# Patient Record
Sex: FEMALE | URBAN - METROPOLITAN AREA
[De-identification: names, ages, dates, MRNs, and addresses within clinical notes are randomized per-mention and may not be internally consistent; named-entity substitution may affect disease eponyms.]

---

## 2019-10-07 ENCOUNTER — APPOINTMENT (RX ONLY)
Dept: URBAN - METROPOLITAN AREA CLINIC 23 | Facility: CLINIC | Age: 29
Setting detail: DERMATOLOGY
End: 2019-10-07

## 2019-10-07 DIAGNOSIS — Z41.9 ENCOUNTER FOR PROCEDURE FOR PURPOSES OTHER THAN REMEDYING HEALTH STATE, UNSPECIFIED: ICD-10-CM

## 2019-10-07 PROCEDURE — ? DYSPORT

## 2019-10-07 PROCEDURE — ? HYALURONIDASE INJECTION

## 2019-10-07 ASSESSMENT — LOCATION SIMPLE DESCRIPTION DERM: LOCATION SIMPLE: LEFT LIP

## 2019-10-07 ASSESSMENT — LOCATION DETAILED DESCRIPTION DERM: LOCATION DETAILED: LEFT INFERIOR VERMILION LIP

## 2019-10-07 ASSESSMENT — LOCATION ZONE DERM: LOCATION ZONE: LIP

## 2019-10-07 NOTE — PROCEDURE: HYALURONIDASE INJECTION
Lot # (Optional): [id number]
Expiration Date (Optional): 10/20
Administered By (Optional): Dr. Henrry Patel
Consent: The risks of contour defects and dimpling of the skin were reviewed with the patient prior to the injection.
Detail Level: Detailed
Total Volume (Ccs): 1
Hyaluronidase Preparation: hyaluronidase 150 USP units/ml

## 2019-10-07 NOTE — PROCEDURE: DYSPORT
Additional Area 5 Location: upper lip cutaneous
Additional Area 2 Location: masseters
Additional Area 2 Units: 48
Anterior Platysmal Bands Units: 0
Additional Area 1 Location: crows feet
Consent: Written consent obtained. Risks include but not limited to lid/brow ptosis, bruising, swelling, diplopia, temporary effect, incomplete chemical denervation.
Post-Care Instructions: Patient instructed to not lie down for 4 hours, limit physical activity for 24 hours and avoid manipulation of the treated areas.
Additional Area 4 Location: neck bands
Detail Level: Zone
Expiration Date (Month Year): 04/30/20
Lot #: [id number]
Additional Area 1 Units: 10
Additional Area 6 Location: chin
Dilution (U/ 0.1cc): 1.5
Additional Area 3 Location: upper forehead

## 2019-10-10 ENCOUNTER — APPOINTMENT (RX ONLY)
Dept: URBAN - METROPOLITAN AREA CLINIC 23 | Facility: CLINIC | Age: 29
Setting detail: DERMATOLOGY
End: 2019-10-10

## 2019-10-10 DIAGNOSIS — Z41.9 ENCOUNTER FOR PROCEDURE FOR PURPOSES OTHER THAN REMEDYING HEALTH STATE, UNSPECIFIED: ICD-10-CM

## 2019-10-10 PROCEDURE — ? ADDITIONAL NOTES

## 2019-10-10 PROCEDURE — ? FILLERS

## 2019-10-10 NOTE — PROCEDURE: FILLERS
Jawline Filler Volume In Cc: 0
Post-Care Instructions: Patient instructed to apply ice to reduce swelling.
Map Statment: See [address] for Complete Details
Include Cannula Size?: 25G
Anesthesia Volume In Cc: 0.3
Include Cannula Information In Note?: No
Include Cannula Length?: 1.5 inch
Additional Area 1 Location: vermillion lips, left cheek smile line, left medial cheek
Include Cannula Brand?: DermaSculpt
Detail Level: Zone
Additional Anesthesia Volume In Cc: 6
Additional Area 2 Location: tear troughs (cannula used).  oral commissures , NFS
Additional Area 1 Location: fine lines , perioral, oral commissures
Additional Area 1 Location: lat cheeks and oral comesure,using an accanula to tear thoughts
Additional Area 3 Location: lateral cheeks, mid cheek
Additional Area 1 Location: chin
Additional Area 2 Location: Remaining syringe will be injected in 7 days post hydase injections to tear through areas.
Filler: Juvederm Vollure XC
Additional Area 5 Location: lateral lower cheeks
Additional Area 4 Location: vermillion lips, perioral lines
Additional Area 3 Location: marionette lines, oral commiseurs, perioral fine lines
Filler: Voluma
Lot #: [id number]
Vermilion Lips Filler Volume In Cc: 1
Additional Area 4 Location: oral commissures,chin, glabella lines, tear trough (cannula used)
Expiration Date (Month Year): 10/28/2020
Lot #: [id number]
Expiration Date (Month Year): 10/15/20
Lot #: [id number]
Price (Use Numbers Only, No Special Characters Or $): 0.00
Anesthesia Type: 1% lidocaine with epinephrine
Lot #: [id number]
Expiration Date (Month Year): 08/31/2020
Include Cannula Information In Note?: Yes
Consent: Written consent obtained. Risks include but not limited to bruising, beading, irregular texture, ulceration, infection, allergic reaction, scar formation, incomplete augmentation, temporary nature, procedural pain.
Expiration Date (Month Year): 05/19/2020

## 2019-10-10 NOTE — PROCEDURE: ADDITIONAL NOTES
Detail Level: Detailed
Additional Notes: Hylenex injected N/C 0.2cc \\nLOT#:  [id number]\\nEXP: 10/2020

## 2019-10-16 ENCOUNTER — APPOINTMENT (RX ONLY)
Dept: URBAN - METROPOLITAN AREA CLINIC 23 | Facility: CLINIC | Age: 29
Setting detail: DERMATOLOGY
End: 2019-10-16

## 2019-10-16 DIAGNOSIS — Z41.9 ENCOUNTER FOR PROCEDURE FOR PURPOSES OTHER THAN REMEDYING HEALTH STATE, UNSPECIFIED: ICD-10-CM

## 2019-10-16 PROCEDURE — ? HYALURONIDASE INJECTION

## 2019-10-16 ASSESSMENT — LOCATION DETAILED DESCRIPTION DERM: LOCATION DETAILED: RIGHT INFERIOR VERMILION LIP

## 2019-10-16 ASSESSMENT — LOCATION SIMPLE DESCRIPTION DERM: LOCATION SIMPLE: RIGHT LIP

## 2019-10-16 ASSESSMENT — LOCATION ZONE DERM: LOCATION ZONE: LIP

## 2019-10-16 NOTE — PROCEDURE: HYALURONIDASE INJECTION
Total Volume (Ccs): 0.2
Hyaluronidase Preparation: hyaluronidase
Detail Level: Detailed
Consent: The risks of contour defects and dimpling of the skin were reviewed with the patient prior to the injection.
Expiration Date (Optional): 10/20
Lot # (Optional): [id number]
Treatment Number (Optional): 2
Administered By (Optional): Dr. Destiny Heiwtt

## 2019-10-30 ENCOUNTER — APPOINTMENT (RX ONLY)
Dept: URBAN - METROPOLITAN AREA CLINIC 23 | Facility: CLINIC | Age: 29
Setting detail: DERMATOLOGY
End: 2019-10-30

## 2019-10-30 DIAGNOSIS — Z41.9 ENCOUNTER FOR PROCEDURE FOR PURPOSES OTHER THAN REMEDYING HEALTH STATE, UNSPECIFIED: ICD-10-CM

## 2019-10-30 PROCEDURE — ? HYALURONIDASE INJECTION

## 2019-10-30 PROCEDURE — ? ADDITIONAL NOTES

## 2019-10-30 ASSESSMENT — LOCATION SIMPLE DESCRIPTION DERM: LOCATION SIMPLE: RIGHT LIP

## 2019-10-30 ASSESSMENT — LOCATION ZONE DERM: LOCATION ZONE: LIP

## 2019-10-30 ASSESSMENT — LOCATION DETAILED DESCRIPTION DERM: LOCATION DETAILED: RIGHT SUPERIOR VERMILION LIP

## 2019-10-30 NOTE — PROCEDURE: HYALURONIDASE INJECTION
Total Volume (Ccs): 0.4
Hyaluronidase Preparation: hyaluronidase 150 USP units/ml
Detail Level: Detailed
Administered By (Optional): Dr. Gagan Ludwig
Lot # (Optional): [id number]
Consent: The risks of contour defects and dimpling of the skin were reviewed with the patient prior to the injection.
Expiration Date (Optional): 10/20

## 2019-10-31 ENCOUNTER — APPOINTMENT (RX ONLY)
Dept: URBAN - METROPOLITAN AREA CLINIC 23 | Facility: CLINIC | Age: 29
Setting detail: DERMATOLOGY
End: 2019-10-31

## 2019-10-31 DIAGNOSIS — Z41.9 ENCOUNTER FOR PROCEDURE FOR PURPOSES OTHER THAN REMEDYING HEALTH STATE, UNSPECIFIED: ICD-10-CM

## 2019-10-31 PROCEDURE — ? HYALURONIDASE INJECTION

## 2019-10-31 ASSESSMENT — LOCATION DETAILED DESCRIPTION DERM: LOCATION DETAILED: LEFT SUPERIOR VERMILION LIP

## 2019-10-31 ASSESSMENT — LOCATION ZONE DERM: LOCATION ZONE: LIP

## 2019-10-31 ASSESSMENT — LOCATION SIMPLE DESCRIPTION DERM: LOCATION SIMPLE: LEFT LIP

## 2019-10-31 NOTE — PROCEDURE: HYALURONIDASE INJECTION
Total Volume (Ccs): 0.2
Hyaluronidase Preparation: hyaluronidase 150 USP units/ml
Lot # (Optional): [id number]
Expiration Date (Optional): 10/20
Administered By (Optional): Dr. Kathy Trammell
Consent: The risks of contour defects and dimpling of the skin were reviewed with the patient prior to the injection.
Detail Level: Detailed

## 2019-11-01 ENCOUNTER — APPOINTMENT (RX ONLY)
Dept: URBAN - METROPOLITAN AREA CLINIC 23 | Facility: CLINIC | Age: 29
Setting detail: DERMATOLOGY
End: 2019-11-01

## 2019-11-01 DIAGNOSIS — Z41.9 ENCOUNTER FOR PROCEDURE FOR PURPOSES OTHER THAN REMEDYING HEALTH STATE, UNSPECIFIED: ICD-10-CM

## 2019-11-01 PROCEDURE — ? FILLERS

## 2019-11-01 NOTE — PROCEDURE: FILLERS
Cheeks Filler Volume In Cc: 0
Additional Area 4 Location: vermillion lips, perioral lines
Additional Area 5 Location: marionettandrey, NFs, oral commissure
Lot #: [id number]
Use Map Statement For Sites (Optional): No
Vermilion Lips Filler Volume In Cc: 1
Expiration Date (Month Year): 03/31/21
Include Cannula Information In Note?: Yes
Lot #: [id number]
Lot #: [id number]
Expiration Date (Month Year): 10/2021
Anesthesia Type: 1% lidocaine with epinephrine
Expiration Date (Month Year): 08/31/2020
Map Statment: See [address] for Complete Details
Expiration Date (Month Year): 05/19/2020
Anesthesia Volume In Cc: 0.3
Include Cannula Brand?: DermaSculpt
Detail Level: Zone
Include Cannula Size?: 25G
Consent: Written consent obtained. Risks include but not limited to bruising, beading, irregular texture, ulceration, infection, allergic reaction, scar formation, incomplete augmentation, temporary nature, procedural pain.
Post-Care Instructions: Patient instructed to apply ice to reduce swelling.
Additional Area 1 Location: vermillion lips, marionette lines, oral commissures
Include Cannula Length?: 1.5 inch
Additional Anesthesia Volume In Cc: 6
Additional Area 1 Location: fine lines , perioral, oral commissures
Additional Area 2 Location: Remaining syringe will be injected in 7 days post hydase injections to tear through areas.
Additional Area 1 Location: chin
Additional Area 1 Location: vermillion lips
Filler: Restylane Silk
Additional Area 2 Location: tear troughs (cannula used).  oral commissures , NFS
Additional Area 3 Location: lateral cheeks, cheeks
Additional Area 3 Location: lateral cheeks, mid cheek
Additional Area 4 Location: vermillion lips, tear trough (cannula used)
Price (Use Numbers Only, No Special Characters Or $): 0.00
Lot #: [id number]

## 2019-11-06 ENCOUNTER — APPOINTMENT (RX ONLY)
Dept: URBAN - METROPOLITAN AREA CLINIC 23 | Facility: CLINIC | Age: 29
Setting detail: DERMATOLOGY
End: 2019-11-06

## 2019-11-06 DIAGNOSIS — Z41.9 ENCOUNTER FOR PROCEDURE FOR PURPOSES OTHER THAN REMEDYING HEALTH STATE, UNSPECIFIED: ICD-10-CM

## 2019-11-06 PROCEDURE — ? FILLERS

## 2019-11-06 ASSESSMENT — LOCATION DETAILED DESCRIPTION DERM: LOCATION DETAILED: LEFT INFERIOR VERMILION LIP

## 2019-11-06 ASSESSMENT — LOCATION SIMPLE DESCRIPTION DERM: LOCATION SIMPLE: LEFT LIP

## 2019-11-06 ASSESSMENT — LOCATION ZONE DERM: LOCATION ZONE: LIP

## 2019-11-06 NOTE — PROCEDURE: FILLERS
Tear Troughs Filler  Volume In Cc: 0
Include Cannula Information In Note?: Yes
Anesthesia Volume In Cc: 0.3
Detail Level: Detailed
Expiration Date (Month Year): 05/19/2020
Include Cannula Brand?: DermaSculpt
Include Cannula Length?: 1.5 inch
Include Cannula Size?: 25G
Consent: Written consent obtained. Risks include but not limited to bruising, beading, irregular texture, ulceration, infection, allergic reaction, scar formation, incomplete augmentation, temporary nature, procedural pain.
Include Cannula Information In Note?: No
Additional Area 1 Location: marionette lines, NLFâs.
Additional Anesthesia Volume In Cc: 6
Post-Care Instructions: Patient instructed to apply ice to reduce swelling.
Additional Area 1 Location: lateral cheeks, marionette lines
Additional Area 1 Location: fine lines , perioral, oral commissures
Price (Use Numbers Only, No Special Characters Or $): 0.00
Additional Area 2 Location: oral commissures ,chin
Filler Comments: physician Sample received from [address] [place] [address] by physician no charge
Additional Area 3 Location: lateral cheeks, mid cheek
Additional Area 2 Location: Remaining syringe will be injected in 7 days post hydase injections to tear through areas.
Additional Area 1 Location: chin
Filler: Restylane Silk
Additional Area 4 Location: vermillion lips, perioral lines
Additional Area 3 Location: Oral commissure
Map Statment: See [address] for Complete Details
Additional Area 4 Location: vermillion lips,oral commissure, tear troughs
Additional Area 5 Location: marionettandrey, NFs, oral commissure
Lot #: [id number]
Lot #: [id number]
Lot #: [id number]
Expiration Date (Month Year): 09/30/20
Anesthesia Type: 1% lidocaine with epinephrine
Expiration Date (Month Year): 10/31/21
Expiration Date (Month Year): 08/31/2020
Lot #: [id number]